# Patient Record
Sex: FEMALE | Race: BLACK OR AFRICAN AMERICAN | Employment: STUDENT | URBAN - METROPOLITAN AREA
[De-identification: names, ages, dates, MRNs, and addresses within clinical notes are randomized per-mention and may not be internally consistent; named-entity substitution may affect disease eponyms.]

---
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
## 2021-02-22 ENCOUNTER — OFFICE VISIT (OUTPATIENT)
Dept: PEDIATRIC ENDOCRINOLOGY | Age: 21
End: 2021-02-22
Payer: COMMERCIAL

## 2021-02-22 VITALS
WEIGHT: 204.6 LBS | DIASTOLIC BLOOD PRESSURE: 75 MMHG | SYSTOLIC BLOOD PRESSURE: 110 MMHG | HEIGHT: 64 IN | TEMPERATURE: 97 F | HEART RATE: 93 BPM | RESPIRATION RATE: 16 BRPM | BODY MASS INDEX: 34.93 KG/M2 | OXYGEN SATURATION: 99 %

## 2021-02-22 DIAGNOSIS — R73.09 ELEVATED HEMOGLOBIN A1C: Primary | ICD-10-CM

## 2021-02-22 LAB — HBA1C MFR BLD HPLC: 5.5 %

## 2021-02-22 PROCEDURE — 83036 HEMOGLOBIN GLYCOSYLATED A1C: CPT | Performed by: PEDIATRICS

## 2021-02-22 PROCEDURE — 99214 OFFICE O/P EST MOD 30 MIN: CPT | Performed by: PEDIATRICS

## 2021-02-22 NOTE — PROGRESS NOTES
Cc:  1. Increased weight gain  2. Acanthosis nigricans  3. Insulin resistance    [facility]:  Patient is here for follow up of increased weight gain. Dietary changes: not doing well, eating bigger portion size and need to work on tiiming of the meals. Physical activity:  minimal.  Patient has increased pigmentation around the neck, changes sine last visit: none. Medication: metformin 500 mg SR 2 tabs at dinner . Other signs of insulin resistance: Dark pigmentation around the neck. ROS/PMH/Social/Family history: no change since last visit dated: oct 26 2020  Objective:     Visit Vitals  /75 (BP 1 Location: Right arm, BP Patient Position: Sitting)   Pulse 93   Temp 97 °F (36.1 °C) (Temporal)   Resp 16   Ht 5' 4.49\" (1.638 m)   Wt 204 lb 9.6 oz (92.8 kg)   LMP 02/21/2021   SpO2 99%   BMI 34.59 kg/m²       Wt Readings from Last 3 Encounters:   02/22/21 204 lb 9.6 oz (92.8 kg) (98 %, Z= 2.01)*   10/26/20 199 lb (90.3 kg) (97 %, Z= 1.94)*   06/24/20 188 lb 3.2 oz (85.4 kg) (96 %, Z= 1.78)*     * Growth percentiles are based on CDC (Girls, 2-20 Years) data. Ht Readings from Last 3 Encounters:   02/22/21 5' 4.49\" (1.638 m) (53 %, Z= 0.08)*   10/26/20 5' 4.76\" (1.645 m) (58 %, Z= 0.19)*   06/24/20 5' 3.9\" (1.623 m) (44 %, Z= -0.15)*     * Growth percentiles are based on CDC (Girls, 2-20 Years) data. Body mass index is 34.59 kg/m². 97 %ile (Z= 1.88) based on CDC (Girls, 2-20 Years) BMI-for-age based on BMI available as of 2/22/2021.   98 %ile (Z= 2.01) based on CDC (Girls, 2-20 Years) weight-for-age data using vitals from 2/22/2021.   53 %ile (Z= 0.08) based on CDC (Girls, 2-20 Years) Stature-for-age data based on Stature recorded on 2/22/2021.    Normal hydration, alert, no distress   HEENT: normal Acanthosis; yes No thyromegaly, pulse equal and normal rhythm   Abdomen is nondistended   DTR: normal, Pedal edema: no Skin: normal    Labs:   Lab Results   Component Value Date/Time    Hemoglobin A1c (POC) 5.5 02/22/2021 02:46 PM     A/P:  1. Increased weight gain:  2. Acanthosis nigricans. yes  3. Hemoglobin A1C  is not in the range that puts her risk for diabetes  4. Insulin resistance  Discussed the labs. Growth chart reviewed. Reviewed labs. Co morbidities of obesity explained: risk for hypertension, high cholesterol, Dietary changes: healthy carbohydrate discussed, portion size and plate method reviewed. Physical activity: 40 minutes per day during week days and 40 minutes x 2 on the weekends/ holidays and summer. Metformin dose reviewed and side effects of metfomin, GI side effects and rare side effect lactic acidosis reviewed. Metformin: 500 mg SR 2 tbs at dinner, Labs: CMP: reviewed.  Follow up in :4 months

## 2021-02-23 DIAGNOSIS — R73.09 ELEVATED HEMOGLOBIN A1C: ICD-10-CM

## 2021-02-23 RX ORDER — METFORMIN HYDROCHLORIDE 500 MG/1
TABLET, EXTENDED RELEASE ORAL
Qty: 60 TAB | Refills: 3 | Status: SHIPPED | OUTPATIENT
Start: 2021-02-23

## 2021-03-12 ENCOUNTER — ANESTHESIA EVENT (OUTPATIENT)
Dept: SURGERY | Age: 21
End: 2021-03-12
Payer: COMMERCIAL

## 2021-03-12 ENCOUNTER — HOSPITAL ENCOUNTER (EMERGENCY)
Age: 21
Discharge: HOME | End: 2021-03-12
Attending: EMERGENCY MEDICINE
Payer: COMMERCIAL

## 2021-03-12 ENCOUNTER — ANESTHESIA (OUTPATIENT)
Dept: SURGERY | Age: 21
End: 2021-03-12
Payer: COMMERCIAL

## 2021-03-12 ENCOUNTER — APPOINTMENT (OUTPATIENT)
Dept: ULTRASOUND IMAGING | Age: 21
End: 2021-03-12
Attending: EMERGENCY MEDICINE
Payer: COMMERCIAL

## 2021-03-12 VITALS
RESPIRATION RATE: 16 BRPM | BODY MASS INDEX: 35.19 KG/M2 | TEMPERATURE: 98.3 F | WEIGHT: 206.13 LBS | DIASTOLIC BLOOD PRESSURE: 72 MMHG | HEIGHT: 64 IN | HEART RATE: 84 BPM | SYSTOLIC BLOOD PRESSURE: 116 MMHG | OXYGEN SATURATION: 98 %

## 2021-03-12 DIAGNOSIS — E83.42 HYPOMAGNESEMIA: ICD-10-CM

## 2021-03-12 DIAGNOSIS — R10.11 RUQ ABDOMINAL PAIN: Primary | ICD-10-CM

## 2021-03-12 DIAGNOSIS — K80.50 BILIARY COLIC: ICD-10-CM

## 2021-03-12 LAB
ALBUMIN SERPL-MCNC: 3.4 G/DL (ref 3.5–5)
ALBUMIN/GLOB SERPL: 0.8 {RATIO} (ref 1.1–2.2)
ALP SERPL-CCNC: 87 U/L (ref 45–117)
ALT SERPL-CCNC: 18 U/L (ref 12–78)
ANION GAP SERPL CALC-SCNC: 11 MMOL/L (ref 5–15)
AST SERPL-CCNC: 17 U/L (ref 15–37)
BASOPHILS # BLD: 0 K/UL (ref 0–0.1)
BASOPHILS NFR BLD: 0 % (ref 0–1)
BILIRUB SERPL-MCNC: 0.3 MG/DL (ref 0.2–1)
BUN SERPL-MCNC: 13 MG/DL (ref 6–20)
BUN/CREAT SERPL: 17 (ref 12–20)
CALCIUM SERPL-MCNC: 9.1 MG/DL (ref 8.5–10.1)
CHLORIDE SERPL-SCNC: 104 MMOL/L (ref 97–108)
CO2 SERPL-SCNC: 25 MMOL/L (ref 21–32)
COVID-19 RAPID TEST, COVR: NOT DETECTED
CREAT SERPL-MCNC: 0.76 MG/DL (ref 0.55–1.02)
DIFFERENTIAL METHOD BLD: ABNORMAL
EOSINOPHIL # BLD: 0 K/UL (ref 0–0.4)
EOSINOPHIL NFR BLD: 0 % (ref 0–7)
ERYTHROCYTE [DISTWIDTH] IN BLOOD BY AUTOMATED COUNT: 14.7 % (ref 11.5–14.5)
GLOBULIN SER CALC-MCNC: 4.3 G/DL (ref 2–4)
GLUCOSE BLD STRIP.AUTO-MCNC: 90 MG/DL (ref 65–100)
GLUCOSE SERPL-MCNC: 90 MG/DL (ref 65–100)
HCG SERPL QL: NEGATIVE
HCT VFR BLD AUTO: 36.6 % (ref 35–47)
HGB BLD-MCNC: 11.6 G/DL (ref 11.5–16)
IMM GRANULOCYTES # BLD AUTO: 0 K/UL (ref 0–0.04)
IMM GRANULOCYTES NFR BLD AUTO: 0 % (ref 0–0.5)
LIPASE SERPL-CCNC: 364 U/L (ref 73–393)
LYMPHOCYTES # BLD: 1.9 K/UL (ref 0.8–3.5)
LYMPHOCYTES NFR BLD: 18 % (ref 12–49)
MAGNESIUM SERPL-MCNC: 1.5 MG/DL (ref 1.6–2.4)
MCH RBC QN AUTO: 25.4 PG (ref 26–34)
MCHC RBC AUTO-ENTMCNC: 31.7 G/DL (ref 30–36.5)
MCV RBC AUTO: 80.1 FL (ref 80–99)
MONOCYTES # BLD: 0.7 K/UL (ref 0–1)
MONOCYTES NFR BLD: 7 % (ref 5–13)
NEUTS SEG # BLD: 7.6 K/UL (ref 1.8–8)
NEUTS SEG NFR BLD: 74 % (ref 32–75)
NRBC # BLD: 0 K/UL (ref 0–0.01)
NRBC BLD-RTO: 0 PER 100 WBC
PLATELET # BLD AUTO: 282 K/UL (ref 150–400)
PMV BLD AUTO: 11.2 FL (ref 8.9–12.9)
POTASSIUM SERPL-SCNC: 3.9 MMOL/L (ref 3.5–5.1)
PROT SERPL-MCNC: 7.7 G/DL (ref 6.4–8.2)
RBC # BLD AUTO: 4.57 M/UL (ref 3.8–5.2)
SARS-COV-2, COV2: NORMAL
SERVICE CMNT-IMP: NORMAL
SODIUM SERPL-SCNC: 140 MMOL/L (ref 136–145)
SOURCE, COVRS: NORMAL
WBC # BLD AUTO: 10.3 K/UL (ref 3.6–11)

## 2021-03-12 PROCEDURE — 77030018875 HC APPL CLP LIG4 J&J -B: Performed by: SURGERY

## 2021-03-12 PROCEDURE — 77030040922 HC BLNKT HYPOTHRM STRY -A

## 2021-03-12 PROCEDURE — 76210000020 HC REC RM PH II FIRST 0.5 HR: Performed by: SURGERY

## 2021-03-12 PROCEDURE — 77030008684 HC TU ET CUF COVD -B: Performed by: ANESTHESIOLOGY

## 2021-03-12 PROCEDURE — 74011250636 HC RX REV CODE- 250/636: Performed by: NURSE ANESTHETIST, CERTIFIED REGISTERED

## 2021-03-12 PROCEDURE — 77030026438 HC STYL ET INTUB CARD -A: Performed by: ANESTHESIOLOGY

## 2021-03-12 PROCEDURE — 77030037032 HC INSRT SCIS CLICKLLINE DISP STOR -B: Performed by: SURGERY

## 2021-03-12 PROCEDURE — 96375 TX/PRO/DX INJ NEW DRUG ADDON: CPT

## 2021-03-12 PROCEDURE — 77030012770 HC TRCR OPT FX AMR -B: Performed by: SURGERY

## 2021-03-12 PROCEDURE — 82962 GLUCOSE BLOOD TEST: CPT

## 2021-03-12 PROCEDURE — 76060000032 HC ANESTHESIA 0.5 TO 1 HR: Performed by: SURGERY

## 2021-03-12 PROCEDURE — 96367 TX/PROPH/DG ADDL SEQ IV INF: CPT

## 2021-03-12 PROCEDURE — 83735 ASSAY OF MAGNESIUM: CPT

## 2021-03-12 PROCEDURE — 77030008608 HC TRCR ENDOSC SMTH AMR -B: Performed by: SURGERY

## 2021-03-12 PROCEDURE — 88304 TISSUE EXAM BY PATHOLOGIST: CPT

## 2021-03-12 PROCEDURE — 74011250636 HC RX REV CODE- 250/636: Performed by: SURGERY

## 2021-03-12 PROCEDURE — 74011000250 HC RX REV CODE- 250: Performed by: SURGERY

## 2021-03-12 PROCEDURE — 77030031139 HC SUT VCRL2 J&J -A: Performed by: SURGERY

## 2021-03-12 PROCEDURE — C9290 INJ, BUPIVACAINE LIPOSOME: HCPCS | Performed by: SURGERY

## 2021-03-12 PROCEDURE — 74011250637 HC RX REV CODE- 250/637: Performed by: SURGERY

## 2021-03-12 PROCEDURE — 76705 ECHO EXAM OF ABDOMEN: CPT

## 2021-03-12 PROCEDURE — 77030040361 HC SLV COMPR DVT MDII -B

## 2021-03-12 PROCEDURE — 77030002933 HC SUT MCRYL J&J -A: Performed by: SURGERY

## 2021-03-12 PROCEDURE — 77030018836 HC SOL IRR NACL ICUM -A: Performed by: SURGERY

## 2021-03-12 PROCEDURE — 74011000250 HC RX REV CODE- 250: Performed by: NURSE ANESTHETIST, CERTIFIED REGISTERED

## 2021-03-12 PROCEDURE — 84703 CHORIONIC GONADOTROPIN ASSAY: CPT

## 2021-03-12 PROCEDURE — 77030013079 HC BLNKT BAIR HGGR 3M -A: Performed by: ANESTHESIOLOGY

## 2021-03-12 PROCEDURE — 96365 THER/PROPH/DIAG IV INF INIT: CPT

## 2021-03-12 PROCEDURE — 74011250636 HC RX REV CODE- 250/636: Performed by: EMERGENCY MEDICINE

## 2021-03-12 PROCEDURE — 99284 EMERGENCY DEPT VISIT MOD MDM: CPT

## 2021-03-12 PROCEDURE — 80053 COMPREHEN METABOLIC PANEL: CPT

## 2021-03-12 PROCEDURE — 77030020829: Performed by: SURGERY

## 2021-03-12 PROCEDURE — 76210000006 HC OR PH I REC 0.5 TO 1 HR: Performed by: SURGERY

## 2021-03-12 PROCEDURE — 74011250636 HC RX REV CODE- 250/636: Performed by: ANESTHESIOLOGY

## 2021-03-12 PROCEDURE — 74011000258 HC RX REV CODE- 258: Performed by: SURGERY

## 2021-03-12 PROCEDURE — 87635 SARS-COV-2 COVID-19 AMP PRB: CPT

## 2021-03-12 PROCEDURE — 85025 COMPLETE CBC W/AUTO DIFF WBC: CPT

## 2021-03-12 PROCEDURE — 36415 COLL VENOUS BLD VENIPUNCTURE: CPT

## 2021-03-12 PROCEDURE — 76010000138 HC OR TIME 0.5 TO 1 HR: Performed by: SURGERY

## 2021-03-12 PROCEDURE — 2709999900 HC NON-CHARGEABLE SUPPLY: Performed by: SURGERY

## 2021-03-12 PROCEDURE — 77030009851 HC PCH RTVR ENDOSC AMR -B: Performed by: SURGERY

## 2021-03-12 PROCEDURE — 83690 ASSAY OF LIPASE: CPT

## 2021-03-12 RX ORDER — GLYCOPYRROLATE 0.2 MG/ML
INJECTION INTRAMUSCULAR; INTRAVENOUS AS NEEDED
Status: DISCONTINUED | OUTPATIENT
Start: 2021-03-12 | End: 2021-03-12 | Stop reason: HOSPADM

## 2021-03-12 RX ORDER — HYDROMORPHONE HYDROCHLORIDE 1 MG/ML
.25-1 INJECTION, SOLUTION INTRAMUSCULAR; INTRAVENOUS; SUBCUTANEOUS
Status: CANCELLED | OUTPATIENT
Start: 2021-03-12

## 2021-03-12 RX ORDER — MIDAZOLAM HYDROCHLORIDE 1 MG/ML
INJECTION, SOLUTION INTRAMUSCULAR; INTRAVENOUS AS NEEDED
Status: DISCONTINUED | OUTPATIENT
Start: 2021-03-12 | End: 2021-03-12 | Stop reason: HOSPADM

## 2021-03-12 RX ORDER — MAGNESIUM SULFATE HEPTAHYDRATE 40 MG/ML
2 INJECTION, SOLUTION INTRAVENOUS ONCE
Status: COMPLETED | OUTPATIENT
Start: 2021-03-12 | End: 2021-03-12

## 2021-03-12 RX ORDER — NEOSTIGMINE METHYLSULFATE 1 MG/ML
INJECTION, SOLUTION INTRAVENOUS AS NEEDED
Status: DISCONTINUED | OUTPATIENT
Start: 2021-03-12 | End: 2021-03-12 | Stop reason: HOSPADM

## 2021-03-12 RX ORDER — LIDOCAINE HYDROCHLORIDE 10 MG/ML
0.1 INJECTION, SOLUTION EPIDURAL; INFILTRATION; INTRACAUDAL; PERINEURAL AS NEEDED
Status: DISCONTINUED | OUTPATIENT
Start: 2021-03-12 | End: 2021-03-12 | Stop reason: HOSPADM

## 2021-03-12 RX ORDER — ACETAMINOPHEN 500 MG
1000 TABLET ORAL EVERY 8 HOURS
Status: DISCONTINUED | OUTPATIENT
Start: 2021-03-12 | End: 2021-03-12 | Stop reason: HOSPADM

## 2021-03-12 RX ORDER — SODIUM CHLORIDE, SODIUM LACTATE, POTASSIUM CHLORIDE, CALCIUM CHLORIDE 600; 310; 30; 20 MG/100ML; MG/100ML; MG/100ML; MG/100ML
100 INJECTION, SOLUTION INTRAVENOUS CONTINUOUS
Status: CANCELLED | OUTPATIENT
Start: 2021-03-12

## 2021-03-12 RX ORDER — ONDANSETRON 4 MG/1
4 TABLET, ORALLY DISINTEGRATING ORAL
Qty: 12 TAB | Refills: 1 | Status: SHIPPED | OUTPATIENT
Start: 2021-03-12

## 2021-03-12 RX ORDER — KETOROLAC TROMETHAMINE 30 MG/ML
30 INJECTION, SOLUTION INTRAMUSCULAR; INTRAVENOUS EVERY 6 HOURS
Status: DISCONTINUED | OUTPATIENT
Start: 2021-03-12 | End: 2021-03-12 | Stop reason: HOSPADM

## 2021-03-12 RX ORDER — DEXAMETHASONE SODIUM PHOSPHATE 4 MG/ML
INJECTION, SOLUTION INTRA-ARTICULAR; INTRALESIONAL; INTRAMUSCULAR; INTRAVENOUS; SOFT TISSUE AS NEEDED
Status: DISCONTINUED | OUTPATIENT
Start: 2021-03-12 | End: 2021-03-12 | Stop reason: HOSPADM

## 2021-03-12 RX ORDER — ROCURONIUM BROMIDE 10 MG/ML
INJECTION, SOLUTION INTRAVENOUS AS NEEDED
Status: DISCONTINUED | OUTPATIENT
Start: 2021-03-12 | End: 2021-03-12 | Stop reason: HOSPADM

## 2021-03-12 RX ORDER — PROPOFOL 10 MG/ML
INJECTION, EMULSION INTRAVENOUS AS NEEDED
Status: DISCONTINUED | OUTPATIENT
Start: 2021-03-12 | End: 2021-03-12 | Stop reason: HOSPADM

## 2021-03-12 RX ORDER — HYDROCODONE BITARTRATE AND ACETAMINOPHEN 5; 325 MG/1; MG/1
1 TABLET ORAL
Qty: 10 TAB | Refills: 0 | Status: SHIPPED | OUTPATIENT
Start: 2021-03-12 | End: 2021-03-15

## 2021-03-12 RX ORDER — ONDANSETRON 2 MG/ML
INJECTION INTRAMUSCULAR; INTRAVENOUS AS NEEDED
Status: DISCONTINUED | OUTPATIENT
Start: 2021-03-12 | End: 2021-03-12 | Stop reason: HOSPADM

## 2021-03-12 RX ORDER — SODIUM CHLORIDE 9 MG/ML
125 INJECTION, SOLUTION INTRAVENOUS ONCE
Status: COMPLETED | OUTPATIENT
Start: 2021-03-12 | End: 2021-03-12

## 2021-03-12 RX ORDER — MORPHINE SULFATE 4 MG/ML
4 INJECTION INTRAVENOUS ONCE
Status: COMPLETED | OUTPATIENT
Start: 2021-03-12 | End: 2021-03-12

## 2021-03-12 RX ORDER — OMEPRAZOLE 20 MG/1
20 CAPSULE, DELAYED RELEASE ORAL DAILY
COMMUNITY

## 2021-03-12 RX ORDER — FENTANYL CITRATE 50 UG/ML
INJECTION, SOLUTION INTRAMUSCULAR; INTRAVENOUS AS NEEDED
Status: DISCONTINUED | OUTPATIENT
Start: 2021-03-12 | End: 2021-03-12 | Stop reason: HOSPADM

## 2021-03-12 RX ORDER — SODIUM CHLORIDE, SODIUM LACTATE, POTASSIUM CHLORIDE, CALCIUM CHLORIDE 600; 310; 30; 20 MG/100ML; MG/100ML; MG/100ML; MG/100ML
100 INJECTION, SOLUTION INTRAVENOUS CONTINUOUS
Status: DISCONTINUED | OUTPATIENT
Start: 2021-03-12 | End: 2021-03-12 | Stop reason: HOSPADM

## 2021-03-12 RX ORDER — SUCCINYLCHOLINE CHLORIDE 20 MG/ML
INJECTION INTRAMUSCULAR; INTRAVENOUS AS NEEDED
Status: DISCONTINUED | OUTPATIENT
Start: 2021-03-12 | End: 2021-03-12 | Stop reason: HOSPADM

## 2021-03-12 RX ORDER — ONDANSETRON 2 MG/ML
4 INJECTION INTRAMUSCULAR; INTRAVENOUS
Status: COMPLETED | OUTPATIENT
Start: 2021-03-12 | End: 2021-03-12

## 2021-03-12 RX ADMIN — Medication 3 MG: at 16:04

## 2021-03-12 RX ADMIN — PROPOFOL 200 MG: 10 INJECTION, EMULSION INTRAVENOUS at 15:21

## 2021-03-12 RX ADMIN — ONDANSETRON 4 MG: 2 INJECTION INTRAMUSCULAR; INTRAVENOUS at 10:10

## 2021-03-12 RX ADMIN — MORPHINE SULFATE 4 MG: 4 INJECTION INTRAVENOUS at 10:10

## 2021-03-12 RX ADMIN — SODIUM CHLORIDE 1000 ML: 9 INJECTION, SOLUTION INTRAVENOUS at 10:10

## 2021-03-12 RX ADMIN — PIPERACILLIN AND TAZOBACTAM 3.38 G: 3; .375 INJECTION, POWDER, LYOPHILIZED, FOR SOLUTION INTRAVENOUS at 12:23

## 2021-03-12 RX ADMIN — SODIUM CHLORIDE, POTASSIUM CHLORIDE, SODIUM LACTATE AND CALCIUM CHLORIDE: 600; 310; 30; 20 INJECTION, SOLUTION INTRAVENOUS at 15:09

## 2021-03-12 RX ADMIN — ROCURONIUM BROMIDE 20 MG: 10 INJECTION INTRAVENOUS at 15:30

## 2021-03-12 RX ADMIN — SODIUM CHLORIDE 125 ML/HR: 9 INJECTION, SOLUTION INTRAVENOUS at 12:35

## 2021-03-12 RX ADMIN — KETOROLAC TROMETHAMINE 30 MG: 30 INJECTION, SOLUTION INTRAMUSCULAR at 12:20

## 2021-03-12 RX ADMIN — MAGNESIUM SULFATE HEPTAHYDRATE 2 G: 40 INJECTION, SOLUTION INTRAVENOUS at 11:21

## 2021-03-12 RX ADMIN — ROCURONIUM BROMIDE 10 MG: 10 INJECTION INTRAVENOUS at 15:21

## 2021-03-12 RX ADMIN — FENTANYL CITRATE 50 MCG: 0.05 INJECTION, SOLUTION INTRAMUSCULAR; INTRAVENOUS at 15:21

## 2021-03-12 RX ADMIN — SUCCINYLCHOLINE CHLORIDE 140 MG: 20 INJECTION, SOLUTION INTRAMUSCULAR; INTRAVENOUS at 15:21

## 2021-03-12 RX ADMIN — GLYCOPYRROLATE 0.4 MG: 0.2 INJECTION INTRAMUSCULAR; INTRAVENOUS at 16:04

## 2021-03-12 RX ADMIN — ACETAMINOPHEN 1000 MG: 500 TABLET ORAL at 12:20

## 2021-03-12 RX ADMIN — SODIUM CHLORIDE, POTASSIUM CHLORIDE, SODIUM LACTATE AND CALCIUM CHLORIDE 100 ML/HR: 600; 310; 30; 20 INJECTION, SOLUTION INTRAVENOUS at 14:21

## 2021-03-12 RX ADMIN — FENTANYL CITRATE 50 MCG: 0.05 INJECTION, SOLUTION INTRAMUSCULAR; INTRAVENOUS at 15:39

## 2021-03-12 RX ADMIN — MIDAZOLAM HYDROCHLORIDE 2 MG: 2 INJECTION, SOLUTION INTRAMUSCULAR; INTRAVENOUS at 15:17

## 2021-03-12 RX ADMIN — ONDANSETRON HYDROCHLORIDE 4 MG: 2 SOLUTION INTRAMUSCULAR; INTRAVENOUS at 16:04

## 2021-03-12 RX ADMIN — DEXAMETHASONE SODIUM PHOSPHATE 8 MG: 4 INJECTION, SOLUTION INTRAMUSCULAR; INTRAVENOUS at 15:40

## 2021-03-12 NOTE — ED TRIAGE NOTES
Pt ambulates to treatment area she states that this morning she began having pain to the RUQ with vomiting.   She was seen by Dr Gilberto Pierre this Wednesday and scheduled for surgery May 13 to have gallbladder removed but was told if she had pain to go to [address] [place] [address] to be seen and possible surgery

## 2021-03-12 NOTE — ED PROVIDER NOTES
Patient is a 73-year-old female accompanied by her mother for evaluation of right upper quadrant abdominal pain that started early this morning. She does have a history of cholelithiasis and is being seen by a local surgeon by the name of Dr. Irma Sexton. She apparently had an ultrasound which showed Carleen lithiasis and is scheduled for a laparoscopic cholecystectomy on 13 May of this year. She was advised however that if she had worsening symptoms that she need to go to the emergency department to be evaluated to rule out active cholecystitis or choledocholithiasis. She denies any fevers but states that she has vomited 3 times. She has significant right upper quadrant abdominal pain. Denies any blood in vomit or stool. Patient admits to eating fried food last night prior to symptom onset. Past Medical History:   Diagnosis Date    Gall stones     Psychiatric problem     Severe obesity ([facility] [address] 8/19/2019       Past Surgical History:   Procedure Laterality Date    HX ADENOIDECTOMY      HX TONSILLECTOMY           History reviewed. No pertinent family history.     Social History     Socioeconomic History    Marital status: SINGLE     Spouse name: Not on file    Number of children: Not on file    Years of education: Not on file    Highest education level: Not on file   Occupational History    Not on file   Social Needs    Financial resource strain: Not on file    Food insecurity     Worry: Not on file     Inability: Not on file    Transportation needs     Medical: Not on file     Non-medical: Not on file   Tobacco Use    Smoking status: Never Smoker    Smokeless tobacco: Never Used   Substance and Sexual Activity    Alcohol use: No    Drug use: No    Sexual activity: Never   Lifestyle    Physical activity     Days per week: Not on file     Minutes per session: Not on file    Stress: Not on file   Relationships    Social connections     Talks on phone: Not on file     Gets together: Not on file     Attends [employer] service: Not on file     Active member of club or organization: Not on file     Attends meetings of clubs or organizations: Not on file     Relationship status: Not on file    Intimate partner violence     Fear of current or ex partner: Not on file     Emotionally abused: Not on file     Physically abused: Not on file     Forced sexual activity: Not on file   Other Topics Concern    Not on file   Social History Narrative    Not on file         ALLERGIES: Risperidone, Cocoa, and Pineapple    Review of Systems   Constitutional: Positive for appetite change. Negative for fever. HENT: Negative for drooling and nosebleeds. Eyes: Negative for photophobia. Respiratory: Negative for cough and shortness of breath. Cardiovascular: Negative for chest pain and leg swelling. Gastrointestinal: Positive for abdominal pain, nausea and vomiting. Negative for blood in stool. Genitourinary: Negative for difficulty urinating. Musculoskeletal: Negative for neck pain. Skin: Negative for rash. Neurological: Negative for seizures and syncope. Hematological: Does not bruise/bleed easily. Psychiatric/Behavioral: Negative. Vitals:    03/12/21 0919   BP: 125/80   Pulse: (!) 101   Resp: 16   Temp: 97.5 °F (36.4 °C)   SpO2: 99%   Weight: 93.5 kg (206 lb 2.1 oz)   Height: 5' 4\" (1.626 m)            Physical Exam  Vitals signs and nursing note reviewed. Constitutional:       General: She is in acute distress. Appearance: She is well-developed. She is not toxic-appearing or diaphoretic. HENT:      Head: Normocephalic and atraumatic. Mouth/Throat:      Comments: mask in place  Eyes:      General: No scleral icterus. Cardiovascular:      Rate and Rhythm: Regular rhythm. Tachycardia present. Heart sounds: No friction rub. Pulmonary:      Effort: Pulmonary effort is normal.      Breath sounds: Normal breath sounds. Abdominal:      Palpations: Abdomen is soft. Tenderness: There is abdominal tenderness in the right upper quadrant. There is rebound. Positive signs include Scott's sign. Negative signs include Rovsing's sign. Skin:     General: Skin is warm and dry. Neurological:      Mental Status: She is alert and oriented to person, place, and time. Psychiatric:         Mood and Affect: Mood normal.         Behavior: Behavior normal.          MDM  Number of Diagnoses or Management Options  Biliary colic  Hypomagnesemia  RUQ abdominal pain  Diagnosis management comments: Appreciate conversation with patient's surgeon Dr. Donna Peres.   Advised transfer to [facility] for surgery this afternoon for symptomatic biliary colic in setting of chronic cholelithiasis       Amount and/or Complexity of Data Reviewed  Clinical lab tests: reviewed and ordered  Tests in the radiology section of CPT®: reviewed and ordered  Decide to obtain previous medical records or to obtain history from someone other than the patient: yes  Discuss the patient with other providers: yes    Patient Progress  Patient progress: improved         Procedures

## 2021-03-12 NOTE — ANESTHESIA PREPROCEDURE EVALUATION
History & Physical        Patient:  Farrel Boeck  YOB: 1931    MRN: [id number]     Acct: [de-identified]    PCP: Rick Monsivais MD    Date of Admission: 12/12/2018    Date of Service: Pt seen/examined on 12/12/2018 and Admitted to Inpatient with expected LOS greater than two midnights due to medical therapy. Chief Complaint:    Chief Complaint   Patient presents with    Cough     Productive cough--white, body aches. Pt states has a sore throat due to coughing. History Of Present Illness:      81 y.o. female who presented to [address] [place] [address] with \"patient was initially at the urgent care and was sent into us for further evaluation. She had an x-ray done at that facility suggesting a right sided pneumonia and she was noted to be a little bit hypoxemic. She's reportedly been sick for 2-3 days. She states that she's never been admitted to the hospital.  Denies any chest pain. No abdominal pain. Presents with fever and tachycardia. \"-per er note and confirmed by myself    Addendum: doesn't want to be admitted, never been in the hospital.    Past Medical History:          Diagnosis Date    COPD (chronic obstructive pulmonary disease) ([facility] [address]     Hypertension        Past Surgical History:      History reviewed. No pertinent surgical history. Medications Prior to Admission:      Prior to Admission medications    Medication Sig Start Date End Date Taking? Authorizing Provider   losartan (COZAAR) 100 MG tablet Take 100 mg by mouth daily   Yes Historical Provider, MD   aspirin 81 MG EC tablet Take 81 mg by mouth daily. Yes Historical Provider, MD   amLODIPine-benazepril (LOTREL) 10-20 MG per capsule Take 1 capsule by mouth daily. Yes Historical Provider, MD   naproxen (NAPROSYN) 375 MG tablet Take 1 tablet by mouth 2 times daily (with meals) for 20 doses 4/25/15 5/5/15  Lala Paredes MD   Multiple Vitamins-Minerals (ONE-A-DAY 50 PLUS) TABS Take 1 tablet by mouth daily. Relevant Problems   No relevant active problems       Anesthetic History   No history of anesthetic complications            Review of Systems / Medical History  Patient summary reviewed and pertinent labs reviewed    Pulmonary  Within defined limits                 Neuro/Psych         Psychiatric history     Cardiovascular  Within defined limits                Exercise tolerance: >4 METS     GI/Hepatic/Renal  Within defined limits              Endo/Other        Obesity     Other Findings              Physical Exam    Airway  Mallampati: II  TM Distance: 4 - 6 cm  Neck ROM: normal range of motion   Mouth opening: Normal     Cardiovascular  Regular rate and rhythm,  S1 and S2 normal,  no murmur, click, rub, or gallop  Rhythm: regular  Rate: normal         Dental  No notable dental hx       Pulmonary  Breath sounds clear to auscultation               Abdominal  GI exam deferred       Other Findings            Anesthetic Plan    ASA: 2  Anesthesia type: general          Induction: Intravenous  Anesthetic plan and risks discussed with: Patient deformity. Skin: Skin color, texture, turgor normal.  No rashes or lesions, or suspicious lesions. Neurologic:  Neurovascularly intact without any focal sensory/motor deficits. Cranial nerves: II-XII intact, grossly non-focal.  Psychiatric:  Alert and oriented, thought content appropriate, normal insight  Capillary Refill: Brisk,< 2 seconds   Peripheral Pulses: +2 palpable, equal bilaterally upper and lower extremities  Lymphatics: no lymphadenopathy      Labs:     Recent Labs      12/12/18   1253   WBC  19.5*   HGB  12.2   HCT  35.7*   PLT  340     Recent Labs      12/12/18   1251   NA  132*   K  3.8   CREATININE  0.9     No results for input(s): AST, ALT, BILIDIR, BILITOT, ALKPHOS in the last 72 hours. No results for input(s): INR in the last 72 hours. No results for input(s): [place] in the last 72 hours. Urinalysis:      Lab Results   Component Value Date    NITRU NEGATIVE 03/04/2014    WBCUA 0-2 03/04/2014    BACTERIA NONE 03/04/2014    RBCUA 0-2 03/04/2014    BLOODU NEGATIVE 03/04/2014    SPECGRAV 1.015 03/04/2014       Radiology:     CXR: I have reviewed the CXR with the following interpretation: rll pneumonia  EKG:  I have reviewed the EKG with the following interpretation:     Xr Chest Standard (2 Vw)    Result Date: 12/12/2018  PROCEDURE: XR CHEST (2 VW) CLINICAL INFORMATION: cough for 2 months and SOB, . COMPARISON: May 22, 2009 TECHNIQUE: PA and lateral views the chest. FINDINGS: Fibroemphysematous changes bilaterally. Chronic scarring/atelectasis right middle and lower lobe. Superimposed infiltrate in the right lung base cannot be excluded. Correlation advised. Ectatic thoracic aorta. Atherosclerotic changes aortic arch. Degenerative changes thoracic spine and both shoulders. Scoliosis. Chronic scarring/atelectasis right middle and lower lobe. Superimposed infiltrate in the right lung base cannot be excluded. Correlation with symptoms advised.  **This report has been created using

## 2021-03-12 NOTE — ED NOTES
TRANSFER - OUT REPORT:    Verbal report given to [facility]) on [facility]  being transferred to [facility] Pre-OP(unit) for ordered procedure       Report consisted of patients Situation, Background, Assessment and   Recommendations(SBAR). Information from the following report(s) ED Summary, MAR and Recent Results was reviewed with the receiving nurse. Lines:   Peripheral IV 03/12/21 Right Antecubital (Active)   Site Assessment Clean, dry, & intact 03/12/21 1010   Phlebitis Assessment 0 03/12/21 1010   Infiltration Assessment 0 03/12/21 1010   Dressing Status Clean, dry, & intact 03/12/21 1010   Dressing Type Transparent 03/12/21 1010   Hub Color/Line Status Pink 03/12/21 1010        Opportunity for questions and clarification was provided.       Patient transported with:   Monitor,saline lock

## 2021-03-12 NOTE — ED NOTES
TRANSFER - OUT REPORT:    Verbal report given to EMT with ANGI(name) [facility]  [facility] for ordered procedure       Report consisted of patients Situation, Background, Assessment and   Recommendations(SBAR). Information from the following report(s) SBAR, MAR, vitals, all test results was reviewed with the EMT with ANGI. Lines:   Peripheral IV 03/12/21 Right Antecubital (Active)   Site Assessment Clean, dry, & intact 03/12/21 1010   Phlebitis Assessment 0 03/12/21 1010   Infiltration Assessment 0 03/12/21 1010   Dressing Status Clean, dry, & intact 03/12/21 1010   Dressing Type Transparent 03/12/21 1010   Hub Color/Line Status Pink 03/12/21 1010        Opportunity for questions and clarification was provided. Patient transported with: [facility] BLS monitoring /hr Reassessment at this time is improved pain. Pt is stable for transport as ordered.

## 2021-03-12 NOTE — DISCHARGE INSTRUCTIONS
Patient Education        Learning About Gallbladder Removal Surgery  What is it? This surgery removes the gallbladder and gallstones. The gallbladder stores bile made by your liver. The bile helps you digest fats. Gallstones are made of cholesterol and other things found in bile. The surgery is also known as cholecystectomy (vy-nvc-efj-REMIGIO-tuh-jose). Your body will work fine without a gallbladder. Bile will go straight from the liver to the intestine. There may be small changes in how you digest food. But you probably won't notice them. How is the surgery done? This is usually a laparoscopic surgery. To do this type of surgery, a doctor puts a lighted tube, or scope, and other surgical tools through small cuts (incisions) in your belly. The doctor is able to see your organs with the scope. After your gallbladder is removed, you will no longer have gallstones. The cuts leave scars that usually fade with time. Open surgery may be done if problems are found during laparoscopic surgery. With open surgery, the gallbladder is removed through one larger cut in your belly. And the hospital stay is longer. What can you expect after surgery? You will probably feel weak and tired for several days after you return home. Your belly may be swollen. If you had laparoscopic surgery, you may also have pain in your shoulder for about 24 hours. You may have gas or need to burp a lot at first.  A few people get diarrhea. It usually goes away in 2 to 4 weeks. But it may last longer. How quickly you get better depends on which kind of surgery you had. For laparoscopic surgery, most people can go back to work or their normal routine in 1 to 2 weeks. It depends on the type of work you do and how you feel. If you have open surgery, it will probably take 4 to 6 weeks before you get back to your normal routine. Follow-up care is a key part of your treatment and safety.  Be sure to make and go to all appointments, and call your doctor if you are having problems. It's also a good idea to know your test results and keep a list of the medicines you take. Where can you learn more? Go to http://www.gray.com/  Enter A722 in the search box to learn more about \"Learning About Gallbladder Removal Surgery. \"  Current as of: April 15, 2020               Content Version: 12.6  © 0490-5552 [employer]. Care instructions adapted under license by [employer] (which disclaims liability or warranty for this information). If you have questions about a medical condition or this instruction, always ask your healthcare professional. [place] [address] any warranty or liability for your use of this information. Cold Therapy Instructions    Your nurse will provide a cold therapy wrap based upon your surgery, need, and your doctor's orders. INSTRUCTIONS FOR USE:  All cooling wraps produce sustained periods of intense cold. NEVER PLACE PAD ON BARE SKIN OR HAVE CONTACT WITH BARE SKIN  Always Use An Insulating Barrier. Tissue injury can occur if these devices are used improperly. Follow your surgeons instructions carefully regarding the frequency, duration and breaks from cold therapy, and the total length of treatment. Check under the pad barrier every 2 hours for skin injury (see below.)      SIGNS OF SKIN INJURY:  STOP USE AND CONTACT YOUR SURGEON if any of the following occur: Increased pain, burning, increased swelling, itching, blisters, increased redness, discoloration, welts, or other change in skin condition. INDICATIONS:  Back, Hip, Knee or Shoulder Surgery and Post-Operative Treatment; Trauma; Orthopedic Rehabilitation      CONTRAINDICATIONS:  Cold therapy should not be used by persons with Diabetes, Raynaud's or other vasospastic disease, cold hypersensititvity, or compromised local circulation.     Certain medical conditions make cold-induced injury more likely. Please consult with your healthcare provider before use.

## 2021-03-12 NOTE — ANESTHESIA POSTPROCEDURE EVALUATION
Procedure(s):  LAPAROSCOPIC CHOLECYSTECTOMY.     general    Anesthesia Post Evaluation      Multimodal analgesia: multimodal analgesia not used between 6 hours prior to anesthesia start to PACU discharge  Patient location during evaluation: PACU  Patient participation: complete - patient participated  Level of consciousness: awake  Pain management: adequate  Airway patency: patent  Anesthetic complications: no  Cardiovascular status: acceptable, blood pressure returned to baseline and hemodynamically stable  Respiratory status: acceptable  Hydration status: acceptable  Post anesthesia nausea and vomiting:  controlled      INITIAL Post-op Vital signs:   Vitals Value Taken Time   /72 03/12/21 1650   Temp 37 °C (98.6 °F) 03/12/21 1618   Pulse 84 03/12/21 1650   Resp 16 03/12/21 1650   SpO2 98 % 03/12/21 1650

## 2021-03-12 NOTE — ED NOTES
Plan of care and all test/meds ordered explained to pt. Good understanding and agreement with plan was verbalized. Pt medicated for pain and nausea IVF infusing as ordered. Call bell in reach, use explained.

## 2021-03-13 NOTE — H&P
Donell Kurtz [facility] [place] [facility]  HISTORY AND PHYSICAL    Name:  Lesley Vieyra  MR#:  [id number]  :  2000  ACCOUNT #:  [de-identified]  ADMIT DATE:  2021      PRIMARY CARE PROVIDER:  Keri Deleon MD    CHIEF COMPLAINT:  Surgical evaluation for symptomatic cholelithiasis. HISTORY OF PRESENT ILLNESS:  The patient is a pleasant 26-year-old female whom I saw in clinic yesterday for symptomatic cholelithiasis. The patient reports a history of attention deficit with poor recall. She was seen yesterday with her mother helped her with planning and questions. The patient reported several months of worsening epigastric abdominal pain radiating to the flank, but not to the back. She denies coke-colored urine, acholic stools, or jaundice. She does have a component of reflux, but not the pain she has been having for the last several months. Pain is clearly reproduced postprandial.    She came to the emergency department today at [address] [place] [address] with similar pain after eating a fatty meal.  Labs are unremarkable. BUN and creatinine was unremarkable. Her lipase was just 364  Transaminase was unremarkable. [facility] Ultrasound of the abdomen reveals gallstones with Scott's sign. Given the clinical condition of the patient, she was assessed to have cholecystitis. REVIEW OF SYSTEMS:  Positive for abdominal pain. Negative general, HEENT, respiratory, cardiovascular, genitourinary, musculoskeletal, neurologic, psychiatric, and hematology. PAST MEDICAL HISTORY:  Reviewed, see HPI. PAST SURGICAL HISTORY:  Reviewed, see HPI. PAST FAMILY HISTORY:  None related to current evaluation. PAST SOCIAL HISTORY:  She is single. She is currently employed. No children. Nonsmoker. Drinks occasionally. HOME MEDICATIONS:  1. Prilosec 20 mg.  2.  Metformin. 3.  Dexmethylphenidate. 4.  Abilify. 5.  Mononessa. 6.  Sertraline. 7.  Hydroxyzine. 8.  Methylphenidate.   9. Ranitidine. ALLERGIES:  RISPERIDONE.  SHE HAS ENVIRONMENTAL ALLERGY TO COCONUT AND PINEAPPLE. PHYSICAL EXAMINATION:  VITAL SIGNS:  In the ER, temperature 97.5, pulse is 101, blood pressure is 125/80, oxygen saturation 99% on room air. Her height is 5 feet 4 inches tall, weight is 206 pounds. CONSTITUTIONAL:  She is a well-appearing, age-appropriate female, in no apparent distress. HEENT:  Normocephalic and atraumatic. NECK:  Supple. Trachea midline. CHEST:  Clear. Nonlabored breathing. HEART:  Tachycardic. ABDOMEN:  Soft. Reproducible epigastric abdominal pain. MUSCULOSKELETAL:  No clubbing, cyanosis, or edema. SKIN:  Clear. No evidence of rashes or lesions. PSYCHIATRIC:  She is appropriate to questions and pleasant. NEUROLOGIC:  Grossly nonfocal.    RADIOLOGY:  See HPI. LABORATORY DATA:  See HPI. ASSESSMENT:  Cholecystis. PLAN:  Risks and benefits were discussed with the patient including risk of damage to structures surrounding the gallbladder and possible postoperative risk of developing hematoma, seroma, or biloma that may have to be managed percutaneously, endoscopically, or surgically. Plan is to proceed with laparoscopic cholecystectomy. All questions were answered. A total time of consultation including review of labs, history, and radiology was approximately 20 minutes. It is a pleasure to participate in her care.       Felipe Barker MD      [id number]  D:  03/12/2021 12:05  T:  03/12/2021 21:57  JOB #:  [id number]  CC:  MD Martha Moss MD

## 2021-03-15 NOTE — DISCHARGE SUMMARY
Discharge Summary    Patient: Valerie Gallardo               Sex: female          DOA: 3/12/2021  9:14 AM       YOB: 2000      Age:  24 y.o.        LOS:  LOS: 0 days                Discharge Date:  3/12/2021    Admission Diagnoses: Cholelithiasis    Discharge Diagnoses:  Cholecystitis    Procedure:  Procedure(s):  LAPAROSCOPIC CHOLECYSTECTOMY    Discharge Condition: Good    Hospital Course: Unremarkable operative procedure. Discharge to home in stable condition. Consults: None    Significant Diagnostic Studies: See full electronic record. Discharge Medications:   See full electronic record. Activity/Diet/Wound Care: See patient administered discharge instructions.     Follow-up: 2 weeks    Yovana Zaldivar MD  [facility]  Office:  [phone]  Fax:  [phone]

## 2021-03-15 NOTE — BRIEF OP NOTE
Brief Postoperative Note    Patient: Sanjeev Gallardo  YOB: 2000  MRN: [id number]    Date of Procedure: 3/12/2021     Pre-Op Diagnosis: cholelithiasis    Post-Op Diagnosis: Cholecystitis      Procedure(s):  LAPAROSCOPIC CHOLECYSTECTOMY    Surgeon(s):  Jc Mccoy MD    Surgical Assistant: Surg Asst-1: Jocelyn Zavaleta RN    Anesthesia: General     Estimated Blood Loss (mL): Minimal    Complications: None    Specimens:   ID Type Source Tests Collected by Time Destination   1 : gallbladder Preservative Gallbladder  Jc Mccoy MD 3/12/2021 1540 Pathology        Implants: * No implants in log *    Drains: * No LDAs found *    Findings:  Thickened gallbladder wall    Electronically Signed by Allison Elizabeth MD on 3/15/2021 at 1:25 PM

## 2021-03-15 NOTE — OP NOTES
Operative Note    Patient: Stacia Crigler Herndon-Willoughby  YOB: 2000  MRN: [id number]    Date of Procedure: 3/12/2021     Pre-Op Diagnosis: cholelithiasis    Post-Op Diagnosis: Cholecystitis      Procedure(s):  LAPAROSCOPIC CHOLECYSTECTOMY    Surgeon(s):  Preston Kaiser MD    Surgical Assistant: Surg Asst-1: Osorio Groves RN    Anesthesia: General     Estimated Blood Loss (mL): Minimal    Complications: None    Specimens:   ID Type Source Tests Collected by Time Destination   1 : gallbladder Preservative Gallbladder  Preston Kaiser MD 3/12/2021 1540 Pathology        Implants: * No implants in log *    Drains: * No LDAs found *    Findings: Thickened gallbladder wall    Electronically Signed by Trish Marley MD on 3/15/2021 at 1:25 PM    Indication:  See paper H/P. Procedure:  After standard pre-anesthetic and pre-operative procedure nursing protocols, general anesthesia instituted. Wth the patient supine on the operating table, the abdomen was cleaned, prepped, and draped in usual sterile fashion. The laparoscopic camera and CO2 insufflation apparatus were affixed to the drapes in the usual fashion. Pre-incision antibiotics were given 30 minutes prior to skin incision. Pre-incision liposomal bupivicaine and 0.5% plain marcaine anesthetic was injected in the usual port sites of the skin. Through a 5mm horizontal right para-umbilical skin incision, the abdomen was entered under direct camera vision with a 5 mm blunt tissue dissecting port. Insufflation was established satisfactorily and maintained at 15mm. The laparoscopic camera was re-introduced and confirmed no gross evidence of intraabdominal visceral injury or bleeding in attaining access. Final midline horizontal port incisions were made, and under direct laparoscopic vision 5 mm and 12mm ventral ports were introduced. The patient was positioned in reverse Trendelenburg with left tilt.  The fundus was grasped and lifted up towards the diaphragm. The infundibulum was retracted laterally and inferiorly. There was thickened peritoneum here and required meticulous dissection in order to completely free the infundibulum and cystic duct. The junction of the cystic duct and gallbladder were clearly identified, and an adequate length of the cystic duct was dissected out. Similarly, the cystic artery was also dissected out and an adequate length was displayed. The critical view of Calot's triangle was obtained and the structures were clearly identified. The cystic duct was clipped high on the infundibulum as possible. Proximal cystic duct was clipped three times and divided with laparoscopic scissors. The cystic artery was clipped twice on the stay side and divided with laparoscopic scissors. With electrocautery, the gallbladder was gently dissected completely from the liver bed and placed in a retrieval bag. Hemostasis was satisfactory. The 5mm ports were removed under direct laparoscopic vision with no concern for preperitoneal or rectus bleeding. The remaining local anesthetic was injected in the pre-peritoneal plane, fascia and subcutaneous tissue at each trocar site under direct endoscopic vision. The gallbladder, instruments and ports were removed from the field and pneumoperitoneum terminally released. All skin incisions were closed with subcuticular 4-0 Monocryl and surgical glue. The patient awoke in satisfactory condition.       Estrella Handley MD

## 2021-05-21 ENCOUNTER — OFFICE VISIT (OUTPATIENT)
Dept: NEUROLOGY | Age: 21
End: 2021-05-21
Payer: COMMERCIAL

## 2021-05-21 DIAGNOSIS — F31.81 BIPOLAR 2 DISORDER (HCC): Primary | ICD-10-CM

## 2021-05-21 DIAGNOSIS — F41.9 SEVERE ANXIETY: ICD-10-CM

## 2021-05-21 DIAGNOSIS — F60.3 BORDERLINE PERSONALITY DISORDER IN ADOLESCENT (HCC): ICD-10-CM

## 2021-05-21 DIAGNOSIS — F43.10 PTSD (POST-TRAUMATIC STRESS DISORDER): ICD-10-CM

## 2021-05-21 DIAGNOSIS — F41.9 MODERATE ANXIETY: ICD-10-CM

## 2021-05-21 DIAGNOSIS — F31.9 BIPOLAR I DISORDER (HCC): ICD-10-CM

## 2021-05-21 PROCEDURE — 90832 PSYTX W PT 30 MINUTES: CPT | Performed by: CLINICAL NEUROPSYCHOLOGIST
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
## 2021-06-04 ENCOUNTER — TRANSCRIBE ORDER (OUTPATIENT)
Dept: SCHEDULING | Age: 21
End: 2021-06-04

## 2021-06-04 DIAGNOSIS — F84.0 AUTISM: Primary | ICD-10-CM

## 2021-07-02 ENCOUNTER — TRANSCRIBE ORDER (OUTPATIENT)
Dept: SCHEDULING | Age: 21
End: 2021-07-02

## 2021-07-02 DIAGNOSIS — F84.0 AUTISM: Primary | ICD-10-CM

## 2022-03-19 PROBLEM — R73.09 ELEVATED HEMOGLOBIN A1C: Status: ACTIVE | Noted: 2017-02-20

## 2022-03-19 PROBLEM — E66.01 SEVERE OBESITY (HCC): Status: ACTIVE | Noted: 2019-08-19

## 2022-07-21 PROBLEM — N94.9 ROUND LIGAMENT PAIN: Status: ACTIVE | Noted: 2022-07-21

## 2022-07-21 PROBLEM — O21.9 NAUSEA AND VOMITING IN PREGNANCY: Status: ACTIVE | Noted: 2022-07-21

## 2022-08-01 PROBLEM — Z3A.23 PREGNANCY WITH 23 COMPLETED WEEKS GESTATION: Status: ACTIVE | Noted: 2022-08-01

## 2022-08-01 PROBLEM — F41.1 GENERALIZED ANXIETY DISORDER WITH PANIC ATTACKS: Status: ACTIVE | Noted: 2022-04-12

## 2022-08-01 PROBLEM — R55 SYNCOPE: Status: ACTIVE | Noted: 2022-03-27

## 2022-08-01 PROBLEM — E66.9 OBESITY, CLASS II, BMI 35-39.9: Status: ACTIVE | Noted: 2021-11-04

## 2022-08-01 PROBLEM — F90.2 ATTENTION DEFICIT HYPERACTIVITY DISORDER (ADHD), COMBINED TYPE: Status: ACTIVE | Noted: 2022-06-16

## 2022-08-01 PROBLEM — Z86.19 HISTORY OF SEXUALLY TRANSMITTED DISEASE: Status: ACTIVE | Noted: 2022-08-01

## 2022-08-01 PROBLEM — O26.00 ABNORMAL WEIGHT GAIN DURING PREGNANCY, ANTEPARTUM: Status: ACTIVE | Noted: 2022-06-28

## 2022-08-01 PROBLEM — O09.92 SUPERVISION OF HIGH RISK PREGNANCY IN SECOND TRIMESTER: Status: ACTIVE | Noted: 2022-04-23

## 2022-08-01 PROBLEM — O09.91 SUPERVISION OF HIGH RISK PREGNANCY IN FIRST TRIMESTER: Status: ACTIVE | Noted: 2022-04-23

## 2022-08-01 PROBLEM — N96 HISTORY OF MULTIPLE MISCARRIAGES: Status: ACTIVE | Noted: 2022-08-01

## 2022-08-01 PROBLEM — F41.0 GENERALIZED ANXIETY DISORDER WITH PANIC ATTACKS: Status: ACTIVE | Noted: 2022-04-12

## 2022-08-01 PROBLEM — O26.12 INSUFFICIENT WEIGHT GAIN DURING PREGNANCY IN SECOND TRIMESTER: Status: ACTIVE | Noted: 2022-08-01

## 2022-08-01 PROBLEM — F43.25 ADJUSTMENT DISORDER WITH MIXED DISTURBANCE OF EMOTIONS AND CONDUCT: Status: ACTIVE | Noted: 2022-06-16

## 2022-10-17 PROBLEM — B37.31 YEAST VAGINITIS: Status: ACTIVE | Noted: 2022-10-17

## 2023-10-20 ENCOUNTER — TELEPHONE (OUTPATIENT)
Age: 23
End: 2023-10-20

## 2023-10-22 ENCOUNTER — APPOINTMENT (OUTPATIENT)
Facility: HOSPITAL | Age: 23
End: 2023-10-22
Payer: COMMERCIAL

## 2023-10-22 ENCOUNTER — HOSPITAL ENCOUNTER (EMERGENCY)
Facility: HOSPITAL | Age: 23
Discharge: HOME | End: 2023-10-22
Attending: STUDENT IN AN ORGANIZED HEALTH CARE EDUCATION/TRAINING PROGRAM
Payer: COMMERCIAL

## 2023-10-22 VITALS
HEIGHT: 64 IN | SYSTOLIC BLOOD PRESSURE: 128 MMHG | HEART RATE: 97 BPM | OXYGEN SATURATION: 98 % | DIASTOLIC BLOOD PRESSURE: 96 MMHG | RESPIRATION RATE: 16 BRPM | WEIGHT: 230 LBS | TEMPERATURE: 97.9 F | BODY MASS INDEX: 39.27 KG/M2

## 2023-10-22 DIAGNOSIS — R51.9 NONINTRACTABLE HEADACHE, UNSPECIFIED CHRONICITY PATTERN, UNSPECIFIED HEADACHE TYPE: Primary | ICD-10-CM

## 2023-10-22 PROCEDURE — 6360000002 HC RX W HCPCS: Performed by: NURSE PRACTITIONER

## 2023-10-22 PROCEDURE — 99284 EMERGENCY DEPT VISIT MOD MDM: CPT

## 2023-10-22 PROCEDURE — 70450 CT HEAD/BRAIN W/O DYE: CPT

## 2023-10-22 PROCEDURE — 96372 THER/PROPH/DIAG INJ SC/IM: CPT

## 2023-10-22 PROCEDURE — 6370000000 HC RX 637 (ALT 250 FOR IP): Performed by: NURSE PRACTITIONER

## 2023-10-22 RX ORDER — KETOROLAC TROMETHAMINE 10 MG/1
10 TABLET, FILM COATED ORAL EVERY 6 HOURS PRN
Qty: 20 TABLET | Refills: 0 | Status: SHIPPED | OUTPATIENT
Start: 2023-10-22

## 2023-10-22 RX ORDER — PROCHLORPERAZINE MALEATE 5 MG/1
10 TABLET ORAL ONCE
Status: COMPLETED | OUTPATIENT
Start: 2023-10-22 | End: 2023-10-22

## 2023-10-22 RX ORDER — CETIRIZINE HYDROCHLORIDE 10 MG/1
10 TABLET ORAL
Status: COMPLETED | OUTPATIENT
Start: 2023-10-22 | End: 2023-10-22

## 2023-10-22 RX ORDER — KETOROLAC TROMETHAMINE 30 MG/ML
30 INJECTION, SOLUTION INTRAMUSCULAR; INTRAVENOUS
Status: COMPLETED | OUTPATIENT
Start: 2023-10-22 | End: 2023-10-22

## 2023-10-22 RX ADMIN — KETOROLAC TROMETHAMINE 30 MG: 30 INJECTION, SOLUTION INTRAMUSCULAR; INTRAVENOUS at 15:22

## 2023-10-22 RX ADMIN — PROCHLORPERAZINE MALEATE 10 MG: 5 TABLET ORAL at 15:21

## 2023-10-22 RX ADMIN — CETIRIZINE HYDROCHLORIDE 10 MG: 10 TABLET, FILM COATED ORAL at 15:21

## 2023-10-22 ASSESSMENT — PAIN - FUNCTIONAL ASSESSMENT: PAIN_FUNCTIONAL_ASSESSMENT: 0-10

## 2023-10-22 ASSESSMENT — ENCOUNTER SYMPTOMS
PHOTOPHOBIA: 1
ABDOMINAL DISTENTION: 0
COUGH: 0

## 2023-10-22 ASSESSMENT — PAIN SCALES - GENERAL: PAINLEVEL_OUTOF10: 8

## 2023-10-22 ASSESSMENT — LIFESTYLE VARIABLES
HOW OFTEN DO YOU HAVE A DRINK CONTAINING ALCOHOL: MONTHLY OR LESS
HOW MANY STANDARD DRINKS CONTAINING ALCOHOL DO YOU HAVE ON A TYPICAL DAY: 1 OR 2
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
## 2023-10-22 NOTE — ED NOTES
4:42 PM  Change of shift. Care of patient taken over from [address] [place] [address]; H&P reviewed, bedside handoff complete. Awaiting CT head w/o contrast result. CT head so showed no acute abnormality. Discussed the result with the patient who verbalized understanding. Patient reports improvement in pain. Patient agreeable with discharge home. Discussed my clinical impression(s), any labs and/or radiology results with the patient. I answered any questions and addressed any concerns. Discussed the importance of following up with their primary care physician and/or specialist(s). Discussed signs or symptoms that would warrant return back to the ER for further evaluation. The patient is agreeable with discharge. LABORATORY TESTS:  No results found for this or any previous visit (from the past 12 hour(s)). IMAGING RESULTS:   CT HEAD WO CONTRAST   Final Result   No acute findings. MEDICATIONS GIVEN:   Medications   ketorolac (TORADOL) injection 30 mg (30 mg IntraMUSCular Given 10/22/23 1522)   prochlorperazine (COMPAZINE) tablet 10 mg (10 mg Oral Given 10/22/23 1521)   cetirizine (ZYRTEC) tablet 10 mg (10 mg Oral Given 10/22/23 1521)       IMPRESSION:   1.  Nonintractable headache, unspecified chronicity pattern, unspecified headache type        PLAN:   PATIENT REFERRED TO:   [facility]  [facility] [facility] [address]  [phone]  Schedule an appointment as soon as possible for a visit       [address]   [phone]  Schedule an appointment as soon as possible for a visit       Lucy Ledbetter MD  [address] [place] [address],[facility] [address] [phone]    Schedule an appointment as soon as possible for a visit in 3 days  As needed    DISCHARGE MEDICATIONS:  New Prescriptions    KETOROLAC (TORADOL) 10 MG TABLET    Take 1 tablet by mouth every 6 hours as needed for Pain     Return to the ED if worse    (Please note that portions of this note were
Patient does not appear to be in any acute distress/shows no evidence of clinical instability at this time. Provider has reviewed discharge instructions with the patient/family. The patient/family verbalized understanding instructions as well as need for follow up for any further symptoms. Discharge papers given, education provided, and any questions answered.         Birdie Morris RN  10/22/23 6891
no

## 2025-06-27 NOTE — PROGRESS NOTES
[address]  [address], [address]  [phone]    Cc:  1. Increased weight gain  2. Acanthosis nigricans  3. Insulin resistance    [facility]:  Patient is here for follow up of increased weight gain. Dietary changes was suggested last visit. They have made good changes. A. Diet: 1. Portion size: normal, has 2 lunches, one packed from home and one at school  2. Snacks: sneaks 3. Junk foods: occasional  B. Physical activity: minimal, limitation of physical activity due to joint pain no, bone pain no. C. Screen time: 2 hours /day Denied increased urination and nocturia. Concerns and problems with diet: no, difficulty with exercise: no, family support: good  Puberty: menstrual cycles are regular, Other signs of insulin resistance: elevated A1C    ROS/PMH/Social/Family history: no change since last visit dated: 10/16/2017  Objective:     Visit Vitals    /72 (BP 1 Location: Right arm, BP Patient Position: Sitting)    Pulse 97    Temp 97.8 °F (36.6 °C) (Oral)    Ht 5' 4.17\" (1.63 m)    Wt 171 lb 3.2 oz (77.7 kg)    LMP 01/28/2018    SpO2 97%    BMI 29.23 kg/m2     Wt Readings from Last 3 Encounters:   02/19/18 171 lb 3.2 oz (77.7 kg) (94 %, Z= 1.60)*   10/16/17 168 lb 9.6 oz (76.5 kg) (94 %, Z= 1.57)*   06/15/17 161 lb 6.4 oz (73.2 kg) (93 %, Z= 1.44)*     * Growth percentiles are based on CDC 2-20 Years data. Ht Readings from Last 3 Encounters:   02/19/18 5' 4.17\" (1.63 m) (51 %, Z= 0.04)*   10/16/17 5' 4.17\" (1.63 m) (52 %, Z= 0.06)*   06/15/17 5' 4.2\" (1.631 m) (54 %, Z= 0.09)*     * Growth percentiles are based on CDC 2-20 Years data. Body mass index is 29.23 kg/(m^2). 95 %ile (Z= 1.64) based on CDC 2-20 Years BMI-for-age data using vitals from 2/19/2018.  94 %ile (Z= 1.60) based on CDC 2-20 Years weight-for-age data using vitals from 2/19/2018.  51 %ile (Z= 0.04) based on CDC 2-20 Years stature-for-age data using vitals from 2/19/2018.    Normal Detail Level: Detailed Price (Use Numbers Only, No Special Characters Or $): 0 hydration, alert, no distress  HEENT: normal  Acanthosis; yes  Eyes: conjunctiva: normal, conjugate eye movements: normal  No thyromegaly  S1 S2 heard: normal rhythm  Bilateral air entry no rhonchi or crepitation  Abdomen is nondistended, DTR: normal  Pedal edema: no Skin: normal    Labs:   Lab Results   Component Value Date/Time    Hemoglobin A1c (POC) 5.3 02/19/2018 08:40 AM   A/P:    1. Increased weight gain: change in weight: increase 3 lbs in the last 4 months and 10 lbs over last 8 months  2. Acanthosis nigricans. 3. Hemoglobin A1C  is not in the range that puts her risk for diabetes  4. Family history of diabetes: yes  5. Insulin resistance. Counseling on the following  Reviewed labs. Co morbidities of obesity explained. Suggestion:  1. Portion size: handouts provided  2. Snacks: 25 healthy snack options   3. Junk foods: to be decreased  Family support: good  Barriers to do diet/ exercise: none  B. Physical activity: 40 minutes per day during week days and 40 minutes x 2 on the weekends. C. Screen time:  1 hour week day and 2 hours per day on week ends. D: Sleep duration: 8-10 hours of sleep  Portion size discussed and importance of eliminating fried foods and healthy choices discussed. Started metformin 500 mg XR 1 tab at dinner, side effects of medication reviewed. Provided a note for school to have only packed lunch from home and not to buy lunch at school.

## (undated) DEVICE — CLICKLINE SCISSORS INSERT: Brand: CLICKLINE

## (undated) DEVICE — PREP SKN CHLRAPRP APL 26ML STR --

## (undated) DEVICE — STRIP,CLOSURE,WOUND,MEDI-STRIP,1/2X4: Brand: MEDLINE

## (undated) DEVICE — INFECTION CONTROL KIT SYS

## (undated) DEVICE — REM POLYHESIVE ADULT PATIENT RETURN ELECTRODE: Brand: VALLEYLAB

## (undated) DEVICE — TROCAR: Brand: KII® OPTICAL ACCESS SYSTEM

## (undated) DEVICE — 3M™ TEGADERM™ TRANSPARENT FILM DRESSING FRAME STYLE, 1624W, 2-3/8 IN X 2-3/4 IN (6 CM X 7 CM), 100/CT 4CT/CASE: Brand: 3M™ TEGADERM™

## (undated) DEVICE — STRAP,POSITIONING,KNEE/BODY,FOAM,4X60": Brand: MEDLINE

## (undated) DEVICE — STERILE POLYISOPRENE POWDER-FREE SURGICAL GLOVES: Brand: PROTEXIS

## (undated) DEVICE — SUTURE MCRYL SZ 4-0 L27IN ABSRB UD L19MM PS-2 1/2 CIR PRIM Y426H

## (undated) DEVICE — SOL IRRIGATION INJ NACL 0.9% 500ML BTL

## (undated) DEVICE — NEEDLE HYPO 22GA L1.5IN BLK S STL HUB POLYPR SHLD REG BVL

## (undated) DEVICE — TISSUE RETRIEVAL SYSTEM: Brand: INZII RETRIEVAL SYSTEM

## (undated) DEVICE — SURGICAL PROCEDURE KIT GEN LAPAROSCOPY LF

## (undated) DEVICE — TROCAR: Brand: KII® SLEEVE

## (undated) DEVICE — DEVICE TRNSF SPIK STL 2008S] MICROTEK MEDICAL INC]

## (undated) DEVICE — LAPAROSCOPIC TROCAR SLEEVE/SINGLE USE: Brand: KII® OPTICAL ACCESS SYSTEM

## (undated) DEVICE — DRAPE,REIN 53X77,STERILE: Brand: MEDLINE

## (undated) DEVICE — PAD,NON-ADHERENT,3X8,STERILE,LF,1/PK: Brand: MEDLINE

## (undated) DEVICE — COVER LT HNDL PLAS RIG 1 PER PK

## (undated) DEVICE — APPLIER CLP M L L11.4IN DIA10MM ENDOSCP ROT MULT FOR LIG

## (undated) DEVICE — SUTURE SZ 0 27IN 5/8 CIR UR-6  TAPER PT VIOLET ABSRB VICRYL J603H